# Patient Record
Sex: FEMALE | Race: BLACK OR AFRICAN AMERICAN
[De-identification: names, ages, dates, MRNs, and addresses within clinical notes are randomized per-mention and may not be internally consistent; named-entity substitution may affect disease eponyms.]

---

## 2020-01-02 ENCOUNTER — HOSPITAL ENCOUNTER (EMERGENCY)
Dept: HOSPITAL 53 - M ED | Age: 30
Discharge: HOME | End: 2020-01-02
Payer: COMMERCIAL

## 2020-01-02 VITALS — DIASTOLIC BLOOD PRESSURE: 62 MMHG | SYSTOLIC BLOOD PRESSURE: 107 MMHG

## 2020-01-02 VITALS — BODY MASS INDEX: 21.3 KG/M2 | HEIGHT: 64 IN | WEIGHT: 124.78 LBS

## 2020-01-02 DIAGNOSIS — Z3A.17: ICD-10-CM

## 2020-01-02 DIAGNOSIS — O99.89: Primary | ICD-10-CM

## 2020-01-02 DIAGNOSIS — M54.31: ICD-10-CM

## 2020-01-02 LAB
APPEARANCE UR: CLEAR
BACTERIA UR QL AUTO: NEGATIVE
BILIRUB UR QL STRIP.AUTO: NEGATIVE
GLUCOSE UR QL STRIP.AUTO: NEGATIVE MG/DL
HGB UR QL STRIP.AUTO: NEGATIVE
KETONES UR QL STRIP.AUTO: (no result) MG/DL
LEUKOCYTE ESTERASE UR QL STRIP.AUTO: NEGATIVE
MUCOUS THREADS URNS QL MICRO: (no result)
NITRITE UR QL STRIP.AUTO: NEGATIVE
PH UR STRIP.AUTO: 7 UNITS (ref 5–9)
PROT UR QL STRIP.AUTO: NEGATIVE MG/DL
RBC # UR AUTO: 1 /HPF (ref 0–3)
SP GR UR STRIP.AUTO: 1.02 (ref 1–1.03)
SQUAMOUS #/AREA URNS AUTO: 0 /HPF (ref 0–6)
UROBILINOGEN UR QL STRIP.AUTO: 0.2 MG/DL (ref 0–2)
WBC #/AREA URNS AUTO: 0 /HPF (ref 0–3)

## 2020-02-08 ENCOUNTER — HOSPITAL ENCOUNTER (EMERGENCY)
Dept: HOSPITAL 53 - M ED | Age: 30
Discharge: HOME | End: 2020-02-08
Payer: COMMERCIAL

## 2020-02-08 VITALS — BODY MASS INDEX: 21.98 KG/M2 | HEIGHT: 64 IN | WEIGHT: 128.75 LBS

## 2020-02-08 VITALS — DIASTOLIC BLOOD PRESSURE: 56 MMHG | SYSTOLIC BLOOD PRESSURE: 110 MMHG

## 2020-02-08 DIAGNOSIS — Z3A.23: ICD-10-CM

## 2020-02-08 DIAGNOSIS — J09.X2: ICD-10-CM

## 2020-02-08 DIAGNOSIS — O99.512: Primary | ICD-10-CM

## 2020-02-08 LAB
FLUAV RNA UPPER RESP QL NAA+PROBE: POSITIVE
FLUBV RNA UPPER RESP QL NAA+PROBE: NEGATIVE

## 2020-05-25 ENCOUNTER — HOSPITAL ENCOUNTER (INPATIENT)
Dept: HOSPITAL 53 - M LDO | Age: 30
LOS: 1 days | Discharge: HOME | End: 2020-05-26
Attending: OBSTETRICS & GYNECOLOGY | Admitting: OBSTETRICS & GYNECOLOGY
Payer: COMMERCIAL

## 2020-05-25 VITALS — DIASTOLIC BLOOD PRESSURE: 64 MMHG | SYSTOLIC BLOOD PRESSURE: 110 MMHG

## 2020-05-25 VITALS — SYSTOLIC BLOOD PRESSURE: 105 MMHG | DIASTOLIC BLOOD PRESSURE: 62 MMHG

## 2020-05-25 VITALS — SYSTOLIC BLOOD PRESSURE: 106 MMHG | DIASTOLIC BLOOD PRESSURE: 53 MMHG

## 2020-05-25 VITALS — DIASTOLIC BLOOD PRESSURE: 71 MMHG | SYSTOLIC BLOOD PRESSURE: 110 MMHG

## 2020-05-25 VITALS — SYSTOLIC BLOOD PRESSURE: 115 MMHG | DIASTOLIC BLOOD PRESSURE: 53 MMHG

## 2020-05-25 VITALS — SYSTOLIC BLOOD PRESSURE: 110 MMHG | DIASTOLIC BLOOD PRESSURE: 56 MMHG

## 2020-05-25 VITALS — SYSTOLIC BLOOD PRESSURE: 105 MMHG | DIASTOLIC BLOOD PRESSURE: 56 MMHG

## 2020-05-25 VITALS — SYSTOLIC BLOOD PRESSURE: 113 MMHG | DIASTOLIC BLOOD PRESSURE: 68 MMHG

## 2020-05-25 VITALS — WEIGHT: 148.59 LBS | BODY MASS INDEX: 25.37 KG/M2 | HEIGHT: 64 IN

## 2020-05-25 VITALS — DIASTOLIC BLOOD PRESSURE: 59 MMHG | SYSTOLIC BLOOD PRESSURE: 108 MMHG

## 2020-05-25 VITALS — DIASTOLIC BLOOD PRESSURE: 59 MMHG | SYSTOLIC BLOOD PRESSURE: 105 MMHG

## 2020-05-25 DIAGNOSIS — Z3A.38: ICD-10-CM

## 2020-05-25 DIAGNOSIS — D64.9: ICD-10-CM

## 2020-05-25 LAB
BASE EXCESS BLDCOA CALC-SCNC: -3.6 MMOL/L
BASE EXCESS BLDCOV CALC-SCNC: -5.5 MMOL/L
CO2 BLDCOA CALC-SCNC: 26.9 MEQ/L
CO2 BLDCOV CALC-SCNC: 20.2 MEQ/L
HCO3 STD BLDCOA-SCNC: 19.8 MEQ/L
HCO3 STD BLDCOA-SCNC: 25 MEQ/L
HCO3 STD BLDCOV-SCNC: 19.1 MEQ/L
HCO3 STD BLDCOV-SCNC: 19.6 MEQ/L
HCT VFR BLD AUTO: 33.4 % (ref 36–47)
HGB BLD-MCNC: 11.1 G/DL (ref 12–15.5)
MCH RBC QN AUTO: 29.6 PG (ref 27–33)
MCHC RBC AUTO-ENTMCNC: 33.2 G/DL (ref 32–36.5)
MCV RBC AUTO: 89.1 FL (ref 80–96)
PCO2 BLDCOA: 60.9 MMHG
PCO2 BLDCOV: 35.1 MMHG
PH BLDCOA: 7.23 UNITS
PH BLDCOV: 7.35 UNITS
PLATELET # BLD AUTO: 158 10^3/UL (ref 150–450)
PO2 BLDCOA: 16.8 MMHG
PO2 BLDCOV: 39.1 MMHG
RBC # BLD AUTO: 3.75 10^6/UL (ref 4–5.4)
SAO2 % BLDCOA: 26.6 %
SAO2 % BLDCOV: 81.8 %
WBC # BLD AUTO: 8.9 10^3/UL (ref 4–10)

## 2020-05-25 PROCEDURE — 0HQ9XZZ REPAIR PERINEUM SKIN, EXTERNAL APPROACH: ICD-10-PCS | Performed by: OBSTETRICS & GYNECOLOGY

## 2020-05-25 RX ADMIN — ACETAMINOPHEN PRN MG: 500 TABLET ORAL at 12:25

## 2020-05-25 RX ADMIN — ACETAMINOPHEN PRN MG: 500 TABLET ORAL at 19:49

## 2020-05-25 RX ADMIN — IBUPROFEN PRN MG: 800 TABLET, FILM COATED ORAL at 09:36

## 2020-05-25 RX ADMIN — IBUPROFEN PRN MG: 800 TABLET, FILM COATED ORAL at 17:50

## 2020-05-25 RX ADMIN — Medication SCH TAB: at 09:36

## 2020-05-25 NOTE — HPEPDOC
Obstetrical History & Physical


General


Date of Admission


May 25, 2020 at 05:12





History of Present Illness


Meredith is a 31yo  with SIUP at 38w2d by lmp c/w 9wk u/s presenting to L&

D with regular ctx and vaginal pain. No LOF. No vaginal bleeding. Feels good 

fetal movement. No f/c/n/v/CP/SOB/cough.


Chief Complaint:  Contractions, term


Information Provided By:  Patient





Prenatal Care


Prenatal Care:  [facility]





Prenatal Dating


Final EDC:  2020


Final EDC by:  LMP, 1st trimester (US)





Antepartum Course


Prenatal Diagnos(e)s


Anemia (iron/vit C), hx of 3mll with first delivery


Height (inches):  64


Pre-Pregnancy weight (lbs.):  124


Admission Weight (lbs.):  147


Change in Weight (lbs.):  23





Past Medical History


Past Obstetrical History :  


   Past Obstetrical History:  Multigravida (10/26/14  at 40wk 7lb4oz 3mll F, 

3/17/17  at 38wk 7lb5oz F)


Past Medical History


Medical History


benign


Surgical History:  Hernia repair (4 years old)





Family History


Significant Family History:  No pertinent family hx





Social History


Marital Status:  


Family situation:  Spouse/partner home


* Smoker:  non-smoker


Alcohol:  Denies


Drugs:  denies





Prenatal Imunizations


Tdap status:  current


Influenza Status:  current





Allergies


Coded Allergies:  


     No Known Allergies (Unverified , 20)





Medications


Scheduled


Ferrous Sulfate (Iron) 325 Mg Tablet, 1 TAB PO TID





Miscellaneous Medications


[multivitamin]


[vit b6]


[vitamin c]





Physical Examination


Physical Examination


GENERAL: Alert and oriented times three.


ABDOMEN: Gravid and non-tender to touch.


FETUS: Is vertex (VTX) by sterile vaginal examination (SVE)


EXTREMITIES: No edema.





Vital Signs/I&O





Vital Signs








  Date Time  Temp Pulse Resp B/P (MAP) Pulse Ox O2 Delivery O2 Flow Rate FiO2


 


20 04:44  90 20 110/71 (84)    


 


20 03:23 98.1       











Pertinent Laboratoy Data


Blood Type:  O+


RBC Antibody Screen:  Negative


HIV:  Negative


Hepatitis B:  Negative


Hepatitis C:  Unknown


Rapid Plasma Reagin:  Nonreactive


Rubella:  Immune


Varicella:  Immune


Chlamydia/Gonorrhea:  Negative


Group B Streptococcus:  Negative


Glucose Tolerance Test:  134





Anatomy Ultrasound


Ultrasound Date:  2020


Placenta Location:  Anterior


Normal Anatomy:  Yes


Placenta Previa:  No





 Steroid Therapy


 Steroid Therapy:  No





Vaginal Examination


Dilation:  6 cm


Effacement:  80%


Station:  -1


Cervical Consistency:  Soft


Cervical Position:  Posterior


Fetal Presentation:  Cephalic presentation





Fetal Assessment


Variability:  Moderate


Accelerations:  Positive


Decelerations:  None





Tocometer


Contractions:  Yes


Frequency:  regular, every 2-5 min.


Duration:  greater than 60 seconds


Strength:  palpated as moderate





Assessment/Plan


Assessment


Meredith is a 31yo  with SIUP at 38w2d by lmp c/w 9wk in active labor with

SCE 6/80/-1 and ctx q3-5min. Cat I FHRT. Vitals wnl, benign exam. Cephalic by 

SCE. GBS negative.





PMhx/PNC significant for: Anemia (iron/vit C), hx of 3mll with first delivery





Plan


Admit and orient.


 and consent.


Diet: clear liquids


Group B Streptococcus (GBS) negative


Labs and intravenous (IV) per unit protocol.


Lactated Ringers (LR): Bolus 1000 mL, then at 125 mL/hr.


Anticipate normal spontaneous delivery ()


Candidate for epidural as desired





MD Telly Elizalde Katrina D MD           May 25, 2020 05:22

## 2020-05-26 VITALS — DIASTOLIC BLOOD PRESSURE: 65 MMHG | SYSTOLIC BLOOD PRESSURE: 101 MMHG

## 2020-05-26 LAB
HCT VFR BLD AUTO: 32.5 % (ref 36–47)
HGB BLD-MCNC: 10.6 G/DL (ref 12–15.5)
MCH RBC QN AUTO: 29.4 PG (ref 27–33)
MCHC RBC AUTO-ENTMCNC: 32.6 G/DL (ref 32–36.5)
MCV RBC AUTO: 90.3 FL (ref 80–96)
PLATELET # BLD AUTO: 158 10^3/UL (ref 150–450)
RBC # BLD AUTO: 3.6 10^6/UL (ref 4–5.4)
WBC # BLD AUTO: 9.7 10^3/UL (ref 4–10)

## 2020-05-26 RX ADMIN — ACETAMINOPHEN PRN MG: 500 TABLET ORAL at 13:40

## 2020-05-26 RX ADMIN — ACETAMINOPHEN PRN MG: 500 TABLET ORAL at 07:36

## 2020-05-26 RX ADMIN — IBUPROFEN PRN MG: 800 TABLET, FILM COATED ORAL at 02:56

## 2020-05-26 RX ADMIN — Medication SCH TAB: at 07:36

## 2020-05-26 RX ADMIN — IBUPROFEN PRN MG: 800 TABLET, FILM COATED ORAL at 13:31

## 2020-05-26 NOTE — IPNPDOC
Postpartum Progress Note


Date of Service:  May 26, 2020


Postpartum Day#:  1


Postpartum Progress Note


SUBJECT:Patient is a 29 yo  s/p  ppd #1.  today concern about bulge 

midline of or abdomen.  denies n/v/pain.  She has been ambulating, voiding 

spontaneously without issue and tolerating regular diet. Breast feeding without 

issue. Reports lochia is [jing a normal period.





OBJECTIVE: 


VITAL SIGNS: Within normal limits, afebrile.


Alert and oriented times three.


Abdomen: Fundus firm at U. Soft, NTTP, diastasis recti.


LE: no edema/erythema/tenderness





A/P


PPD #1, doing well.  discuss with patient waiting for postpartum appointment to 

reassess diastasis recti.  abdominal binder ordered.  discharge today. 





Le, DO





VS, I&O, 24H, Fishbone


Vital Signs/I&O





Vital Signs








  Date Time  Temp Pulse Resp B/P (MAP) Pulse Ox O2 Delivery O2 Flow Rate FiO2


 


20 06:05 97.3 63 17 101/65 (77)    














I&O- Last 24 Hours up to 6 AM 


 


 20





 06:00


 


Intake Total 1880 ml


 


Output Total 650 ml


 


Balance 1230 ml











Laboratory Data


24H LABS


Laboratory Tests 2


20 08:53: 


Cord Arterial Blood pH 7.232, Cord Arterial Blood PCO2 60.9, Cord Arterial Blood

 PO2 16.8, Cord Arterial Blood HCO3 25.0, Cord Arterial Blood Total CO2 26.9, 

Cord Arterial Blood Base Excess -3.6, Cord Arterial Base Excess (Standard 19.8, 

Cord Arterial Bld Oxygen Saturation 26.6, Cord Venous Blood pH 7.354, Cord 

Venous Blood PCO2 35.1, Cord Venous Blood PO2 39.1, Cord Venous Blood HCO3 19.1,

 Cord Venous Blood Total CO2 20.2, Cord Venous Base Excess (Actual) -5.5, Cord 

Venous Base Excess (Standard) 19.6, Cord Venous Blood Oxygen Saturation 81.8


20 07:10: Nucleated Red Blood Cells % (auto) 0.0


CBC/BMP


Laboratory Tests


20 07:10

















MEEK MORALES DO                  May 26, 2020 07:49

## 2020-05-26 NOTE — OBDS
[facility] Obstetrical Discharge Sum.


Obstetrical Discharge Summary


:  3


Term:  3


Pre-term:  0


Abortions:  0


Living:  3


VDRL:  Non-Reactive


Rh:  Positive


Rubella:  Immune


Infant Sex:  Male


Infant Weight:  pounds (9), ounces (7), grams (4270)





A/P, Post Partum Course


List any complications


Admission diagnosis: 


Labor at 38+2wks 





Discharge diagnosis:


postpartum ()


 


Condition at Discharge: stable


Discharge Instructions: Home


Activity: as tolerated


Diet: regular


Medications: filled at [facility]


Follow-up: 6-8wks postpartum





Hospital course:


Patient admitted in labor at term.  Patient progressed to have a spontaneous 

vaginal delivery.  Postpartum course uncomplicated and patient discharged home 

on postpartum day #1.











MEEK MORALES DO                  May 25, 2020 21:44

## 2020-05-26 NOTE — IPN
DATE:  2020

 

This patient requested circumcision of her  male infant.  After discussing

the risks and benefits of circumcision, the medical and non-medical indications,

penile block and aftercare, expressed understanding of penile block, aftercare

and bleeding, signed the consent form. All questions were answered.  20 minute

discussion. We await the clearance by the pediatrician.

## 2020-05-29 NOTE — DN
DATE OF DELIVERY:  2020

 

30-year-old,  3, para 2, admitted at 38 and 2 weeks of gestation in labor.

At full dilatation, she had spontaneous rupture of membranes, moderate fluid, 500

mL.  Then had a spontaneous vaginal delivery over a first degree tear of a live

birth male infant, 9 pounds 6 ounces, 4270 grams, Apgars of 7 and 9 at one and

five minutes respectively.  Cord around the neck times two and around the leg

times one.  The placenta delivered spontaneously thereafter, three-vessel and

cord, membranes and tissues intact.  Arterial pH was 7.23, base excess -3.6;

venous pH 7.35, base excess -5.5.  On examination, she had a small first degree

oversewn with #2-0 Vicryl on a [id number].  Anterior, posterior and lateral walls were

complete.  The sphincter was tight.  The uterus contracted well under Pitocin.

The patient and baby tolerating the procedure well.  The baby had terminal

meconium at delivery.